# Patient Record
Sex: MALE | Race: WHITE | NOT HISPANIC OR LATINO | Employment: OTHER | URBAN - METROPOLITAN AREA
[De-identification: names, ages, dates, MRNs, and addresses within clinical notes are randomized per-mention and may not be internally consistent; named-entity substitution may affect disease eponyms.]

---

## 2018-06-12 ENCOUNTER — OFFICE VISIT (OUTPATIENT)
Dept: URGENT CARE | Facility: CLINIC | Age: 68
End: 2018-06-12
Payer: MEDICARE

## 2018-06-12 VITALS
TEMPERATURE: 99 F | HEART RATE: 74 BPM | OXYGEN SATURATION: 100 % | DIASTOLIC BLOOD PRESSURE: 80 MMHG | BODY MASS INDEX: 30.48 KG/M2 | WEIGHT: 230 LBS | HEIGHT: 73 IN | SYSTOLIC BLOOD PRESSURE: 155 MMHG

## 2018-06-12 DIAGNOSIS — I10 HYPERTENSION, UNSPECIFIED TYPE: Primary | ICD-10-CM

## 2018-06-12 PROCEDURE — 3079F DIAST BP 80-89 MM HG: CPT | Mod: CPTII,S$GLB,, | Performed by: NURSE PRACTITIONER

## 2018-06-12 PROCEDURE — 99203 OFFICE O/P NEW LOW 30 MIN: CPT | Mod: S$GLB,,, | Performed by: NURSE PRACTITIONER

## 2018-06-12 PROCEDURE — 3077F SYST BP >= 140 MM HG: CPT | Mod: CPTII,S$GLB,, | Performed by: NURSE PRACTITIONER

## 2018-06-12 RX ORDER — AMLODIPINE BESYLATE 5 MG/1
5 TABLET ORAL DAILY
Qty: 7 TABLET | Refills: 0 | Status: SHIPPED | OUTPATIENT
Start: 2018-06-12 | End: 2018-06-19

## 2018-06-12 NOTE — PROGRESS NOTES
"Subjective:       Patient ID: Jose Finney is a 67 y.o. male.    Vitals:  height is 6' 1" (1.854 m) and weight is 104.3 kg (230 lb). His temperature is 98.7 °F (37.1 °C). His blood pressure is 155/80 (abnormal) and his pulse is 74. His oxygen saturation is 100%.     Chief Complaint: Hypertension    Pt is here c/o high blood pressure. Pt is asymptomatic- no dizziness, nose bleeds, headaches or blurry vision. Pt says delmy sent a doctor to his house and he was getting readings of 170s/110s. Pt says the doctor told him to come here for medication that can temporarily lower the pressure and then call his PCP tomorrow to set up apt. Pt has not seen this PCP before. Pt has never taken any daily medication before. Pt says he drinks vodka every afternoon and has been eating fast food more frequently. Pt says he also has been stressed recently with family issues.       Hypertension   This is a new problem. The current episode started today (a  from human took patients pressure and suggested the visit today.). The problem is unchanged. There are no associated agents to hypertension. There are no known risk factors for coronary artery disease.     ROS    Objective:      Physical Exam   Constitutional: He is oriented to person, place, and time. He appears well-developed and well-nourished. He is cooperative.  Non-toxic appearance. He does not appear ill. No distress.   HENT:   Head: Normocephalic and atraumatic.   Right Ear: Hearing, tympanic membrane, external ear and ear canal normal.   Left Ear: Hearing, tympanic membrane, external ear and ear canal normal.   Nose: Nose normal. No mucosal edema, rhinorrhea or nasal deformity. No epistaxis. Right sinus exhibits no maxillary sinus tenderness and no frontal sinus tenderness. Left sinus exhibits no maxillary sinus tenderness and no frontal sinus tenderness.   Mouth/Throat: Uvula is midline, oropharynx is clear and moist and mucous membranes are normal. No trismus in the " jaw. Normal dentition. No uvula swelling. No posterior oropharyngeal erythema.   Eyes: Conjunctivae and lids are normal. Right eye exhibits no discharge. Left eye exhibits no discharge. No scleral icterus.   Sclera clear bilat   Neck: Trachea normal, normal range of motion, full passive range of motion without pain and phonation normal. Neck supple.   Cardiovascular: Normal rate, regular rhythm, normal heart sounds, intact distal pulses and normal pulses.    No murmur heard.  Pulmonary/Chest: Effort normal and breath sounds normal. No respiratory distress. He has no decreased breath sounds. He has no wheezes.   Abdominal: Soft. Normal appearance and bowel sounds are normal. He exhibits no distension, no pulsatile midline mass and no mass. There is no tenderness.   Musculoskeletal: Normal range of motion. He exhibits no edema or deformity.   Neurological: He is alert and oriented to person, place, and time. He exhibits normal muscle tone. Coordination normal.   Skin: Skin is warm, dry and intact. He is not diaphoretic. No pallor.   Psychiatric: He has a normal mood and affect. His speech is normal and behavior is normal. Judgment and thought content normal. Cognition and memory are normal.   Nursing note and vitals reviewed.      Assessment:       1. Hypertension, unspecified type        Plan:         Hypertension, unspecified type  -     amLODIPine (NORVASC) 5 MG tablet; Take 1 tablet (5 mg total) by mouth once daily.  Dispense: 7 tablet; Refill: 0    Discussed with pt that since his BP came down some, that he does not need any medications administered in the clinic. Pt was given Norvasc and told to follow up with PCP ASAP and go to the ER if his BP is high again  Patient Instructions       CALL YOUR PRIMARY CARE TO MAKE APPOINTMENT     CHECK YOUR BLOOD PRESSURE BEFORE TAKING NORVASC      Discharge Instructions: Taking Your Blood Pressure  Blood pressure is the force of blood as it moves from the heart through the  blood vessels. You can take your own blood pressure reading using a digital monitor. Take readings as often as your healthcare provider instructs. Take your readings each time in the same way, using the same arm. Here are guidelines for taking your blood pressure.  The American Heart Association (AHA) recommends purchasing a blood pressure monitor that is validated and approved by the Association for the Advancement of Medical Instrumentation, the [place] Hypertension Society, and the International Protocol for the Validation of Automated BP Measuring Devices. If the blood pressure monitor is for a senior adult, a pregnant woman, or a child, make certain it is validated for use with such a population. For the most reliable readings, the AHA recommends an automatic, cuff-style, upper arm (bicep) monitor. The readings from finger and wrist monitors are not as reliable as the upper arm monitor.        Step 1. Relax    · Wait at least a half hour after smoking, eating, or exercising. Do not drink coffee, tea, soda, or other caffeinated beverages before checking your blood pressure.   · Sit comfortably at a table. Place the monitor near you.  · Rest for a few minutes before you begin.        Step 2. Wrap the cuff    · Place your arm on the table, palm up. Put your arm in a position that is level with your heart. Wrap the cuff around your upper arm, about an inch above your elbow. Its best to wrap the cuff on bare skin, not over clothing.  · Make sure your cuff fits. If it doesnt wrap around your upper arm, order a larger cuff. A cuff that is too large or too small can result in an inaccurate blood pressure reading.           Step 3. Inflate the cuff    · Pump the cuff until the scale reads 200. If you have a self-inflating cuff, push the button that starts the pump.  · The cuff will tighten, then loosen.  · The numbers will change. When they stop changing, your blood pressure reading will appear.  · If you get a  reading that is too high or too low for you, relax for a few minutes. Then do the test again.    Step 4. Write down the results  · Write down your blood pressure numbers. Paul the date and time. Keep your results in one place, such as a notebook.  · Remove the cuff from your arm. Turn off the machine.  · Take the readings with you to your medical appointments.  · If you start a new blood pressure medicine, or change a blood pressure medicine dose, note the day you started the new drug or dosage on your blood pressure recording sheet. This will help your healthcare provider monitor the effect of medication changes.     Date Last Reviewed: 4/27/2016 © 2000-2016 [employer]. [address] [place] [address], [place] [address]. All rights reserved. This information is not intended as a substitute for professional medical care. Always follow your healthcare professional's instructions.        Discharge Instructions: Taking Calcium Channel Blockers  Your healthcare provider prescribed a medicine called a calcium channel blocker for you. This type of medicine can treat high blood pressure, correct abnormal heart rhythms, and relieve a type of chest pain called angina.     The name of your calcium channel blocker is  _____________________      Home care  · Follow the fact sheet that came with your medicine. It tells you when and how to take your medicine. Ask for a sheet if you didnt get one.  · Take this medicine exactly as directed, even if you feel fine.  · If you miss a dose of this medicine, take it as soon as you remember--unless its almost time for your next dose. In that case, just wait and take your next dose at the normal time. Dont take a double dose. If you aren't sure what to do, call your healthcare provider or your pharmacist.  · Dont drive until you know how you will react to this medicine.  · Tell your healthcare provider about any other medicines or herbal remedies you are using.  · Be sure to  give this medicine time to work. It may take several weeks to lower blood pressure.  · Learn to take your own pulse. Keep a record of your results. Ask your provider which pulse rates mean that you need medical attention.  · Dont eat grapefruit or drink grapefruit juice. These may interact with calcium channel blockers.  · Ask your healthcare provider how much exercise and activity is safe.  · See your provider regularly while taking this medicine.  Possible side effects  Tell your healthcare provider if you have any of these side effects. Dont stop taking the medicine unless your doctor tells you to. Mild side effects include:  · Sore, bleeding gums  · Mild headache  · Dizziness or lightheadedness  · Flushing  · Nausea  · Leg swelling  When to call your healthcare provider  Call your healthcare provider right away if you have any of the following:  · Severe headache  · Slow, weak pulse  · Breathing problems, coughing, or wheezing  · Irregular, fast, or pounding heartbeat  · Skin rash  · Swollen ankles, feet, or lower legs  · Constipation   Date Last Reviewed: 6/1/2016  © 9515-7569 [employer]. [address] [place] [address], [place] [address]. All rights reserved. This information is not intended as a substitute for professional medical care. Always follow your healthcare professional's instructions.

## 2018-06-12 NOTE — PATIENT INSTRUCTIONS
CALL YOUR PRIMARY CARE TO MAKE APPOINTMENT     CHECK YOUR BLOOD PRESSURE BEFORE TAKING NORVASC      Discharge Instructions: Taking Your Blood Pressure  Blood pressure is the force of blood as it moves from the heart through the blood vessels. You can take your own blood pressure reading using a digital monitor. Take readings as often as your healthcare provider instructs. Take your readings each time in the same way, using the same arm. Here are guidelines for taking your blood pressure.  The American Heart Association (AHA) recommends purchasing a blood pressure monitor that is validated and approved by the Association for the Advancement of Medical Instrumentation, the [place] Hypertension Society, and the International Protocol for the Validation of Automated BP Measuring Devices. If the blood pressure monitor is for a senior adult, a pregnant woman, or a child, make certain it is validated for use with such a population. For the most reliable readings, the AHA recommends an automatic, cuff-style, upper arm (bicep) monitor. The readings from finger and wrist monitors are not as reliable as the upper arm monitor.        Step 1. Relax    · Wait at least a half hour after smoking, eating, or exercising. Do not drink coffee, tea, soda, or other caffeinated beverages before checking your blood pressure.   · Sit comfortably at a table. Place the monitor near you.  · Rest for a few minutes before you begin.        Step 2. Wrap the cuff    · Place your arm on the table, palm up. Put your arm in a position that is level with your heart. Wrap the cuff around your upper arm, about an inch above your elbow. Its best to wrap the cuff on bare skin, not over clothing.  · Make sure your cuff fits. If it doesnt wrap around your upper arm, order a larger cuff. A cuff that is too large or too small can result in an inaccurate blood pressure reading.           Step 3. Inflate the cuff    · Pump the cuff until the scale reads  200. If you have a self-inflating cuff, push the button that starts the pump.  · The cuff will tighten, then loosen.  · The numbers will change. When they stop changing, your blood pressure reading will appear.  · If you get a reading that is too high or too low for you, relax for a few minutes. Then do the test again.    Step 4. Write down the results  · Write down your blood pressure numbers. Paul the date and time. Keep your results in one place, such as a notebook.  · Remove the cuff from your arm. Turn off the machine.  · Take the readings with you to your medical appointments.  · If you start a new blood pressure medicine, or change a blood pressure medicine dose, note the day you started the new drug or dosage on your blood pressure recording sheet. This will help your healthcare provider monitor the effect of medication changes.     Date Last Reviewed: 4/27/2016  © 9967-9478 [employer]. [address] [place] [address]. All rights reserved. This information is not intended as a substitute for professional medical care. Always follow your healthcare professional's instructions.        Discharge Instructions: Taking Calcium Channel Blockers  Your healthcare provider prescribed a medicine called a calcium channel blocker for you. This type of medicine can treat high blood pressure, correct abnormal heart rhythms, and relieve a type of chest pain called angina.     The name of your calcium channel blocker is  _____________________      Home care  · Follow the fact sheet that came with your medicine. It tells you when and how to take your medicine. Ask for a sheet if you didnt get one.  · Take this medicine exactly as directed, even if you feel fine.  · If you miss a dose of this medicine, take it as soon as you remember--unless its almost time for your next dose. In that case, just wait and take your next dose at the normal time. Dont take a double dose. If you aren't sure what to do,  call your healthcare provider or your pharmacist.  · Dont drive until you know how you will react to this medicine.  · Tell your healthcare provider about any other medicines or herbal remedies you are using.  · Be sure to give this medicine time to work. It may take several weeks to lower blood pressure.  · Learn to take your own pulse. Keep a record of your results. Ask your provider which pulse rates mean that you need medical attention.  · Dont eat grapefruit or drink grapefruit juice. These may interact with calcium channel blockers.  · Ask your healthcare provider how much exercise and activity is safe.  · See your provider regularly while taking this medicine.  Possible side effects  Tell your healthcare provider if you have any of these side effects. Dont stop taking the medicine unless your doctor tells you to. Mild side effects include:  · Sore, bleeding gums  · Mild headache  · Dizziness or lightheadedness  · Flushing  · Nausea  · Leg swelling  When to call your healthcare provider  Call your healthcare provider right away if you have any of the following:  · Severe headache  · Slow, weak pulse  · Breathing problems, coughing, or wheezing  · Irregular, fast, or pounding heartbeat  · Skin rash  · Swollen ankles, feet, or lower legs  · Constipation   Date Last Reviewed: 6/1/2016  © 3278-4653 [employer]. [address] [place] [address], [place] [address]. All rights reserved. This information is not intended as a substitute for professional medical care. Always follow your healthcare professional's instructions.

## 2021-08-08 ENCOUNTER — CLINICAL SUPPORT (OUTPATIENT)
Dept: URGENT CARE | Facility: CLINIC | Age: 71
End: 2021-08-08
Payer: MEDICARE

## 2021-08-08 DIAGNOSIS — Z11.52 ENCOUNTER FOR SCREENING FOR COVID-19: Primary | ICD-10-CM

## 2021-08-08 LAB
CTP QC/QA: YES
SARS-COV-2 RDRP RESP QL NAA+PROBE: NEGATIVE

## 2021-08-08 PROCEDURE — 99211 PR OFFICE/OUTPT VISIT, EST, LEVL I: ICD-10-PCS | Mod: S$GLB,,, | Performed by: NURSE PRACTITIONER

## 2021-08-08 PROCEDURE — U0002 COVID-19 LAB TEST NON-CDC: HCPCS | Mod: QW,S$GLB,, | Performed by: NURSE PRACTITIONER

## 2021-08-08 PROCEDURE — U0002: ICD-10-PCS | Mod: QW,S$GLB,, | Performed by: NURSE PRACTITIONER

## 2021-08-08 PROCEDURE — 99211 OFF/OP EST MAY X REQ PHY/QHP: CPT | Mod: S$GLB,,, | Performed by: NURSE PRACTITIONER

## 2022-11-18 ENCOUNTER — TELEPHONE (OUTPATIENT)
Dept: DERMATOLOGY | Facility: CLINIC | Age: 72
End: 2022-11-18
Payer: MEDICARE

## 2022-11-18 NOTE — TELEPHONE ENCOUNTER
----- Message from Jorge Hooks LPN sent at 11/16/2022  4:22 PM CST -----  Regarding: FW: speak with nurse  Contact: patient    ----- Message -----  From: Claribel Archibald  Sent: 11/16/2022  11:55 AM CST  To: [facility] Staff  Subject: speak with nurse                                 [phone]    please call patient being referred need skin check waiting on a call back from the nurse thanks.

## 2022-11-21 ENCOUNTER — TELEPHONE (OUTPATIENT)
Dept: DERMATOLOGY | Facility: CLINIC | Age: 72
End: 2022-11-21
Payer: MEDICARE

## 2022-11-21 NOTE — TELEPHONE ENCOUNTER
"Spoke with pt. Informed pt that provider was booked through February. Advised pt that he has an appt already scheduled with one of our other providers. Pt stated he had a lesion that he felt needed to be looked at sooner. Offered pt a Dec. 12 appt with acute derm clinic, pt declined, stated he wanted something sooner than that. Further advised pt that would be the soonest available appt. Pt stated he will "shop around"      ----- Message from Cecilia Jewell sent at 11/18/2022  8:26 AM CST -----  SID JENSEN calling regarding Patient Advice (message) for missed call from [facility] ,call back [phone]    "